# Patient Record
Sex: MALE | URBAN - METROPOLITAN AREA
[De-identification: names, ages, dates, MRNs, and addresses within clinical notes are randomized per-mention and may not be internally consistent; named-entity substitution may affect disease eponyms.]

---

## 2023-03-28 ENCOUNTER — APPOINTMENT (RX ONLY)
Dept: URBAN - METROPOLITAN AREA CLINIC 79 | Facility: CLINIC | Age: 28
Setting detail: DERMATOLOGY
End: 2023-03-28

## 2023-03-28 DIAGNOSIS — L21.8 OTHER SEBORRHEIC DERMATITIS: ICD-10-CM | Status: INADEQUATELY CONTROLLED

## 2023-03-28 PROCEDURE — ? PRESCRIPTION SAMPLES PROVIDED

## 2023-03-28 PROCEDURE — ? COUNSELING

## 2023-03-28 PROCEDURE — 99204 OFFICE O/P NEW MOD 45 MIN: CPT

## 2023-03-28 PROCEDURE — ? PRESCRIPTION

## 2023-03-28 RX ORDER — KETOCONAZOLE 20 MG/ML
1 SHAMPOO, SUSPENSION TOPICAL BIW
Qty: 120 | Refills: 6 | Status: ERX | COMMUNITY
Start: 2023-03-28

## 2023-03-28 RX ADMIN — KETOCONAZOLE 1: 20 SHAMPOO, SUSPENSION TOPICAL at 00:00

## 2023-03-28 ASSESSMENT — LOCATION DETAILED DESCRIPTION DERM: LOCATION DETAILED: LEFT SUPERIOR MEDIAL FOREHEAD

## 2023-03-28 ASSESSMENT — SEVERITY ASSESSMENT: HOW SEVERE IS THIS PATIENT'S CONDITION?: MODERATE

## 2023-03-28 ASSESSMENT — LOCATION ZONE DERM: LOCATION ZONE: FACE

## 2023-03-28 ASSESSMENT — LOCATION SIMPLE DESCRIPTION DERM: LOCATION SIMPLE: LEFT FOREHEAD

## 2023-03-28 NOTE — PROCEDURE: PRESCRIPTION SAMPLES PROVIDED
Samples Given: Lexette
Detail Level: Zone
Lot/Batch Number (Optional): [id number]
Expiration Date (Optional): 9/23